# Patient Record
Sex: FEMALE | HISPANIC OR LATINO | Employment: UNEMPLOYED | URBAN - METROPOLITAN AREA
[De-identification: names, ages, dates, MRNs, and addresses within clinical notes are randomized per-mention and may not be internally consistent; named-entity substitution may affect disease eponyms.]

---
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
## 2023-01-05 ENCOUNTER — THERAPY VISIT (OUTPATIENT)
Dept: PHYSICAL THERAPY | Facility: CLINIC | Age: 18
End: 2023-01-05
Attending: ORTHOPAEDIC SURGERY
Payer: COMMERCIAL

## 2023-01-05 DIAGNOSIS — Z98.890 STATUS POST SURGERY: Primary | ICD-10-CM

## 2023-01-05 DIAGNOSIS — S82.891A ANKLE FRACTURE, RIGHT, CLOSED, INITIAL ENCOUNTER: ICD-10-CM

## 2023-01-05 PROCEDURE — 97110 THERAPEUTIC EXERCISES: CPT | Mod: 59

## 2023-01-05 PROCEDURE — 97530 THERAPEUTIC ACTIVITIES: CPT | Mod: GP

## 2023-01-05 NOTE — PROGRESS NOTES
Ankle/Foot Evaluation  ROM:    AROM:    Dorsiflexion:  Left:   6 knee straight  Right:   0  Plantarflexion:  Left:      Right:  64  Inversion:  Left:  40     Right:  40  Eversion:  30     Right:  10        PROM:    Dorsiflexion: Left:        Right:   4        Eversion: Left:      Right:  20    EDEMA: Edema ankle: incisions healing well.  Left ankle edema present at: 50  Right ankle edema present at:  52

## 2023-01-12 ENCOUNTER — THERAPY VISIT (OUTPATIENT)
Dept: PHYSICAL THERAPY | Facility: CLINIC | Age: 18
End: 2023-01-12
Attending: ORTHOPAEDIC SURGERY
Payer: COMMERCIAL

## 2023-01-12 DIAGNOSIS — S82.891A ANKLE FRACTURE, RIGHT, CLOSED, INITIAL ENCOUNTER: ICD-10-CM

## 2023-01-12 DIAGNOSIS — Z98.890 STATUS POST SURGERY: Primary | ICD-10-CM

## 2023-01-12 PROCEDURE — 97530 THERAPEUTIC ACTIVITIES: CPT | Mod: GP

## 2023-01-12 PROCEDURE — 97110 THERAPEUTIC EXERCISES: CPT | Mod: GP

## 2023-01-12 NOTE — PROGRESS NOTES
Ankle/Foot Evaluation  ROM:    AROM:    Dorsiflexion:  Left:   6 knee straight  Right:   4  Plantarflexion:  Left:      Right:  70  Inversion:  Left:  40     Right:  50  Eversion:  30     Right:  10    CKC DF Left: 3cm Right: 0 cm (3cm knee away from wall)      EDEMA: Edema ankle: incisions healing well.  Left ankle edema present at: 50  Right ankle edema present at:  52

## 2023-04-30 PROBLEM — S82.891A ANKLE FRACTURE, RIGHT, CLOSED, INITIAL ENCOUNTER: Status: RESOLVED | Noted: 2022-12-22 | Resolved: 2023-04-30

## 2023-04-30 PROBLEM — Z98.890 STATUS POST SURGERY: Status: RESOLVED | Noted: 2022-12-22 | Resolved: 2023-04-30

## 2023-06-02 ENCOUNTER — HEALTH MAINTENANCE LETTER (OUTPATIENT)
Age: 18
End: 2023-06-02

## 2023-06-20 ENCOUNTER — ANCILLARY PROCEDURE (OUTPATIENT)
Dept: GENERAL RADIOLOGY | Facility: CLINIC | Age: 18
End: 2023-06-20
Attending: ORTHOPAEDIC SURGERY
Payer: COMMERCIAL

## 2023-06-20 ENCOUNTER — OFFICE VISIT (OUTPATIENT)
Dept: ORTHOPEDICS | Facility: CLINIC | Age: 18
End: 2023-06-20
Payer: COMMERCIAL

## 2023-06-20 DIAGNOSIS — M25.571 PAIN IN JOINT, ANKLE AND FOOT, RIGHT: Primary | ICD-10-CM

## 2023-06-20 DIAGNOSIS — Z98.890 STATUS POST SURGERY: ICD-10-CM

## 2023-06-20 DIAGNOSIS — Z98.890 STATUS POST SURGERY: Primary | ICD-10-CM

## 2023-06-20 PROCEDURE — 99214 OFFICE O/P EST MOD 30 MIN: CPT | Performed by: ORTHOPAEDIC SURGERY

## 2023-06-20 PROCEDURE — 73610 X-RAY EXAM OF ANKLE: CPT | Mod: RT | Performed by: RADIOLOGY

## 2023-06-20 NOTE — LETTER
6/20/2023       RE: Barbie Bauer  [address]  [address]  [facility] [address]    Dear Colleague,    Thank you for referring your patient, Barbie Bauer, to the [facility] [place]. Please see a copy of my visit note below.    Chief complaint status post right ankle open reduction internal fixation performed on October 21, 2022    Barbie Bauer presents today for further evaluation unfortunately she did not have a 10-week follow-up given the fact that she went to [place] and now she is back.  Overall she reports to be doing okay.  Reports to have some achiness across the ankle joint.  The achiness is fairly diffuse as the as well as sporadic.    On today's exam she presented with full range of motion of the right ankle hindfoot and midfoot joint CMS intact skin is intact.  There is a Stone abrasion along the medial incision which I suspect it is from rubbing.    3 views of the ankle were reviewed today which are significant for showing complete consolidation of the fractures with hardware is intact and in place the syndesmosis congruent    Assessment status post right ankle proximal internal fixation    Plan discussed with the patient that at this point I do not have a clear understanding for the source of her discomfort.  I proposed her to proceed with hardware removal from the right ankle something that at this point she is not ready to pursue    All questions were answered.  Patient will proceed with activities as tolerated.  She has no restrictions.  Encouraged the patient to follow-up with us if she is inclined to proceed with hardware removal from the ankle    TT 20 minutes        Panchito Ryan MD

## 2023-06-20 NOTE — NURSING NOTE
Reason For Visit:   Chief Complaint   Patient presents with     RECHECK     Right ankle ORIF DOS 10/21/2022. Regular recheck. Was unable to recheck in earlier this year as she was in [place]. Doing well. XR today. Some intermittant swelling and a scab on incision       There were no vitals taken for this visit.         Tiara Dubon, ATC

## 2023-06-21 NOTE — PROGRESS NOTES
Chief complaint status post right ankle open reduction internal fixation performed on October 21, 2022    Barbie Bauer presents today for further evaluation unfortunately she did not have a 10-week follow-up given the fact that she went to [place] and now she is back.  Overall she reports to be doing okay.  Reports to have some achiness across the ankle joint.  The achiness is fairly diffuse as the as well as sporadic.    On today's exam she presented with full range of motion of the right ankle hindfoot and midfoot joint CMS intact skin is intact.  There is a [place] abrasion along the medial incision which I suspect it is from rubbing.    3 views of the ankle were reviewed today which are significant for showing complete consolidation of the fractures with hardware is intact and in place the syndesmosis congruent    Assessment status post right ankle proximal internal fixation    Plan discussed with the patient that at this point I do not have a clear understanding for the source of her discomfort.  I proposed her to proceed with hardware removal from the right ankle something that at this point she is not ready to pursue    All questions were answered.  Patient will proceed with activities as tolerated.  She has no restrictions.  Encouraged the patient to follow-up with us if she is inclined to proceed with hardware removal from the ankle    TT 20 minutes

## 2024-03-07 ENCOUNTER — OFFICE VISIT (OUTPATIENT)
Dept: FAMILY MEDICINE | Facility: CLINIC | Age: 19
End: 2024-03-07
Payer: COMMERCIAL

## 2024-03-07 VITALS
WEIGHT: 212.5 LBS | SYSTOLIC BLOOD PRESSURE: 133 MMHG | BODY MASS INDEX: 36.28 KG/M2 | TEMPERATURE: 99.5 F | RESPIRATION RATE: 17 BRPM | OXYGEN SATURATION: 98 % | HEART RATE: 112 BPM | HEIGHT: 64 IN | DIASTOLIC BLOOD PRESSURE: 83 MMHG

## 2024-03-07 DIAGNOSIS — Z00.00 ENCOUNTER FOR ANNUAL PHYSICAL EXAM: Primary | ICD-10-CM

## 2024-03-07 DIAGNOSIS — Z11.3 SCREEN FOR STD (SEXUALLY TRANSMITTED DISEASE): ICD-10-CM

## 2024-03-07 DIAGNOSIS — Z12.4 CERVICAL CANCER SCREENING: ICD-10-CM

## 2024-03-07 PROCEDURE — 87389 HIV-1 AG W/HIV-1&-2 AB AG IA: CPT

## 2024-03-07 PROCEDURE — 36415 COLL VENOUS BLD VENIPUNCTURE: CPT

## 2024-03-07 PROCEDURE — 99395 PREV VISIT EST AGE 18-39: CPT

## 2024-03-07 PROCEDURE — 87591 N.GONORRHOEAE DNA AMP PROB: CPT

## 2024-03-07 PROCEDURE — 86780 TREPONEMA PALLIDUM: CPT

## 2024-03-07 PROCEDURE — 87491 CHLMYD TRACH DNA AMP PROBE: CPT

## 2024-03-07 SDOH — HEALTH STABILITY: PHYSICAL HEALTH: ON AVERAGE, HOW MANY MINUTES DO YOU ENGAGE IN EXERCISE AT THIS LEVEL?: 60 MIN

## 2024-03-07 SDOH — HEALTH STABILITY: PHYSICAL HEALTH: ON AVERAGE, HOW MANY DAYS PER WEEK DO YOU ENGAGE IN MODERATE TO STRENUOUS EXERCISE (LIKE A BRISK WALK)?: 3 DAYS

## 2024-03-07 ASSESSMENT — PATIENT HEALTH QUESTIONNAIRE - PHQ9
SUM OF ALL RESPONSES TO PHQ QUESTIONS 1-9: 15
SUM OF ALL RESPONSES TO PHQ QUESTIONS 1-9: 15
10. IF YOU CHECKED OFF ANY PROBLEMS, HOW DIFFICULT HAVE THESE PROBLEMS MADE IT FOR YOU TO DO YOUR WORK, TAKE CARE OF THINGS AT HOME, OR GET ALONG WITH OTHER PEOPLE: SOMEWHAT DIFFICULT

## 2024-03-07 ASSESSMENT — SOCIAL DETERMINANTS OF HEALTH (SDOH): HOW OFTEN DO YOU GET TOGETHER WITH FRIENDS OR RELATIVES?: MORE THAN THREE TIMES A WEEK

## 2024-03-07 ASSESSMENT — PAIN SCALES - GENERAL: PAINLEVEL: MILD PAIN (2)

## 2024-03-07 NOTE — PROGRESS NOTES
Preventive Care Visit  [facility]GR[facility] PRIMARY [facility]  Alfredo Boone NP, Nurse Practitioner Primary Care  Mar 7, 2024    Assessment & Plan     Cervical cancer screening  - Ob/Gyn  Referral; Future    Screen for STD (sexually transmitted disease)  - NEISSERIA GONORRHOEA PCR; Future  - CHLAMYDIA TRACHOMATIS PCR; Future  - HIV Antigen Antibody Combo; Future  - Treponema Abs w Reflex to RPR and Titer; Future    Encounter for annual physical exam  Discussed priorities of getting into exercise routine from joining the gym.  Would also like to prioritize the patient's mental health and make sure that their mental health is in a good place.  We also discussed possibility of switching THC and cannabis vape to edibles and working on reducing to a point of not vaping every day.  Offered primary care services as a support for when they are not placed to cut down on THC.    Depression Screening Follow Up        3/7/2024    12:26 PM   PHQ   PHQ-9 Total Score 15   Q9: Thoughts of better off dead/self-harm past 2 weeks Not at all         Subjective   Karina is a 19 year old, presenting for the following:  Physical  Grew up in [place].    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  Just broke up yesterday. Still processing.  Stress concern: global stress. Able to function. Does community work.  Diet:  Eating a lot of smoothie bowls. Fruits and spinach. Pasta. Meats (1-2 dinners).  Exercise: started going to gym 2x/week. Goes to class with weight lifting focus.  Transportation difficulties- getting to the dentist.     MH:  Feels that depression. OCD-type symptoms. NO SI.      3/7/2024    12:40 PM   Additional Questions   Roomed by Marleny Rae        Health Care Directive  Patient does not have a Health Care Directive or Living Will: Not discussed at today's visit.    HPI        3/7/2024   General Health   How would you rate your overall physical health? Good    Feel stress (tense, anxious, or unable to sleep) Very much   (!) STRESS CONCERN      3/7/2024   Nutrition   Three or more servings of calcium each day? Yes   Diet: Regular (no restrictions)    Breakfast skipped   How many servings of fruit and vegetables per day? (!) 2-3   How many sweetened beverages each day? 0-1         3/7/2024   Exercise   Days per week of moderate/strenous exercise 3 days   Average minutes spent exercising at this level 60 min         3/7/2024   Social Factors   Frequency of gathering with friends or relatives More than three times a week   Worry food won't last until get money to buy more No   Food not last or not have enough money for food? No   Do you have housing?  Yes   Are you worried about losing your housing? No   Lack of transportation? Yes   Unable to get utilities (heat,electricity)? No    (!) TRANSPORTATION CONCERN PRESENT      3/7/2024   Dental   Dentist two times every year? (!) NO         3/7/2024   TB Screening   Were you born outside of [place]?  No       Today's PHQ-9 Score:       3/7/2024    12:26 PM   PHQ-9 SCORE   PHQ-9 Total Score MyChart 15 (Moderately severe depression)   PHQ-9 Total Score 15         3/7/2024   Substance Use   Alcohol more than 3/day or more than 7/wk No   Do you use any other substances recreationally? (!) CANNABIS PRODUCTS    (!) SYNTHETIC MARIJUANA     Social History     Tobacco Use    Smoking status: Never    Smokeless tobacco: Never    Tobacco comments:     vaping   Vaping Use    Vaping Use: Every day    Substances: THC   Substance Use Topics    Alcohol use: Yes     Comment: socially    Drug use: Yes     Types: Marijuana             3/7/2024   One time HIV Screening   Previous HIV test? No         3/7/2024   STI Screening   New sexual partner(s) since last STI/HIV test? (!) YES      History of abnormal Pap smear: NO - age 21-29 PAP every 3 years recommended             3/7/2024   Contraception/Family Planning   Questions about contraception or family  "planning No        Reviewed and updated as needed this visit by Provider                             Answers submitted by the patient for this visit:  Patient Health Questionnaire (Submitted on 3/7/2024)  If you checked off any problems, how difficult have these problems made it for you to do your work, take care of things at home, or get along with other people?: Somewhat difficult  PHQ9 TOTAL SCORE: 15      Objective    Exam  /83 (BP Location: Right arm, Patient Position: Sitting, Cuff Size: Adult Large)   Pulse 112   Temp 99.5  F (37.5  C) (Temporal)   Resp 17   Ht 1.616 m (5' 3.62\")   Wt 96.4 kg (212 lb 8 oz)   LMP  (LMP Unknown)   SpO2 98%   BMI 36.91 kg/m     Estimated body mass index is 36.91 kg/m  as calculated from the following:    Height as of this encounter: 1.616 m (5' 3.62\").    Weight as of this encounter: 96.4 kg (212 lb 8 oz).    Physical Exam  GENERAL: alert and no distress  EYES: Eyes grossly normal to inspection, PERRL and conjunctivae and sclerae normal  HENT: ear canals and TM's normal, nose and mouth without ulcers or lesions  NECK: no adenopathy, no asymmetry, masses, or scars  RESP: lungs clear to auscultation - no rales, rhonchi or wheezes  CV: regular rate and rhythm, normal S1 S2, no S3 or S4, no murmur, click or rub, no peripheral edema  ABDOMEN: soft, nontender, no hepatosplenomegaly, no masses and bowel sounds normal  MS: no gross musculoskeletal defects noted, no edema  SKIN: no suspicious lesions or rashes  NEURO: Normal strength and tone, mentation intact and speech normal  PSYCH: mentation appears normal, affect normal/bright      Signed Electronically by: Alfredo Boone NP    "

## 2024-03-07 NOTE — COMMUNITY RESOURCES LIST (ENGLISH)
03/07/2024   [facility]  N/A  For questions about this resource list or additional care needs, please contact your primary care clinic or care manager.  Phone: [phone]   Email: N/A   Address: [facility] [place] [address]   Hours: N/A        Transportation       Free or low-cost transportation  1  [facility] Distance: 1.57 miles      In-Person   [address] [place] [address]  Language: English  Hours: Mon - Fri 8:00 AM - 3:00 PM  Fees: Free   Phone: (565) 595-6728 Ext.14 Email: [email or web address] Website: [email or web address]     2  [facility] Distance: 1.63 miles      In-Person   [address] [place] [address]  Language: English  Hours: Mon - Wed 9:30 AM - 12:00 PM , Mon - Wed 1:00 PM - 2:00 PM Appt. Only  Fees: Free   Phone: (451) 691-3041 Email: info@[employer].org Website: http://www.[employer]/     Transportation to medical appointments  3  [facility] Medical Transportation - Non-Emergency Medical Transportation Distance: 6.69 miles      In-Person   [address] [place] [address]  Language: English  Hours: Mon - Fri 8:00 AM - 4:00 PM Appt. Only  Fees: Self Pay   Phone: (534) 867-1160 Website: http://[email or web address]/Medical-Services/Emergency-medical-services/Non-emergency-transportation/     4  [facility] Transportation Programs - Non-Emergency Medical Transportation Distance: 7.23 miles      In-Person   [address] [facility] [address] [place] [address]  Language: English, Jamaican, Namibian  Hours: Mon - Fri 7:00 AM - 6:00 PM  Fees: Insurance   Phone: (774) [phone] Ext.2232 Email: romel@[employer].net Website: http://[email or web address].net/[place]/          Important Numbers & Websites       Emergency Services   911  City Services   311  Poison Control   (398) 273-7502  Suicide Prevention Lifeline   (314) 680-9591 (TALK)  Child Abuse Hotline   (399) 524-5970 (4-A-Child)  Sexual  Assault Hotline   (529) 313-9609 (HOPE)  National Runaway Safeline   (907) 656-8983 (RUNAWAY)  All-Options Talkline   (237) 874-2979  Substance Abuse Referral   (780) 484-9881 (HELP)

## 2024-03-08 LAB
C TRACH DNA SPEC QL NAA+PROBE: NEGATIVE
HIV 1+2 AB+HIV1 P24 AG SERPL QL IA: NONREACTIVE
N GONORRHOEA DNA SPEC QL NAA+PROBE: NEGATIVE
T PALLIDUM AB SER QL: NONREACTIVE

## 2024-07-19 ENCOUNTER — MYC REFILL (OUTPATIENT)
Dept: FAMILY MEDICINE | Facility: CLINIC | Age: 19
End: 2024-07-19
Payer: COMMERCIAL

## 2024-07-22 DIAGNOSIS — F42.9 OBSESSIVE-COMPULSIVE DISORDER, UNSPECIFIED TYPE: Primary | ICD-10-CM

## 2024-07-22 RX ORDER — FLUOXETINE 40 MG/1
80 CAPSULE ORAL DAILY
OUTPATIENT
Start: 2024-07-22

## 2024-07-22 RX ORDER — FLUOXETINE 40 MG/1
CAPSULE ORAL
Qty: 42 CAPSULE | Refills: 1 | Status: SHIPPED | OUTPATIENT
Start: 2024-07-22 | End: 2024-09-16

## 2024-09-05 ENCOUNTER — TELEPHONE (OUTPATIENT)
Dept: FAMILY MEDICINE | Facility: CLINIC | Age: 19
End: 2024-09-05
Payer: COMMERCIAL

## 2024-09-05 NOTE — TELEPHONE ENCOUNTER
Patient is asking if they can get a mental health referral?    Thanks,     Constance Saul     [facility]  [phone] - Clinic Line  [phone] - Scheduling Line

## 2024-09-16 DIAGNOSIS — F42.9 OBSESSIVE-COMPULSIVE DISORDER, UNSPECIFIED TYPE: ICD-10-CM

## 2024-09-16 RX ORDER — FLUOXETINE 40 MG/1
80 CAPSULE ORAL DAILY
Qty: 60 CAPSULE | Refills: 3 | Status: SHIPPED | OUTPATIENT
Start: 2024-09-16

## 2024-09-21 ENCOUNTER — MYC MEDICAL ADVICE (OUTPATIENT)
Dept: FAMILY MEDICINE | Facility: CLINIC | Age: 19
End: 2024-09-21
Payer: COMMERCIAL

## 2024-09-21 ENCOUNTER — MYC REFILL (OUTPATIENT)
Dept: FAMILY MEDICINE | Facility: CLINIC | Age: 19
End: 2024-09-21
Payer: COMMERCIAL

## 2024-09-21 DIAGNOSIS — F42.9 OBSESSIVE-COMPULSIVE DISORDER, UNSPECIFIED TYPE: ICD-10-CM

## 2024-09-23 RX ORDER — FLUOXETINE 40 MG/1
80 CAPSULE ORAL DAILY
Qty: 60 CAPSULE | Refills: 3 | OUTPATIENT
Start: 2024-09-23

## 2024-09-23 NOTE — TELEPHONE ENCOUNTER
Refill sent the pharmacy 9/16/24    Rosemarie BHANDARI RN  [facility]

## 2024-12-17 DIAGNOSIS — F42.9 OBSESSIVE-COMPULSIVE DISORDER, UNSPECIFIED TYPE: ICD-10-CM

## 2024-12-17 RX ORDER — FLUOXETINE 40 MG/1
80 CAPSULE ORAL DAILY
Qty: 60 CAPSULE | Refills: 3 | Status: SHIPPED | OUTPATIENT
Start: 2024-12-17

## 2025-01-21 DIAGNOSIS — F42.9 OBSESSIVE-COMPULSIVE DISORDER, UNSPECIFIED TYPE: ICD-10-CM

## 2025-01-21 RX ORDER — FLUOXETINE 40 MG/1
80 CAPSULE ORAL DAILY
Qty: 60 CAPSULE | Refills: 3 | OUTPATIENT
Start: 2025-01-21

## 2025-01-29 NOTE — PROGRESS NOTES
SUBJECTIVE:   Karina is a 20-year-old complaining of increased amount of white vaginal discharge for 2 weeks. Other symptoms include abnormal odor and itching. Denies urinary symptoms. Not concerned about STD exposure but open to testing today.     Typically has irregular menstrual cyles - 2-3 cycles yearly. They are sexually active with partner who can get them pregnant. Partner is taking hormones to transition to female and was under the impression that she could not get someone pregnant at this point.     Works as debate team  with young teens.     Patient's last menstrual period was 12/18/2024.    OBJECTIVE:   /80   Pulse 98   Temp 97.7  F (36.5  C) (Oral)   Wt 95.2 kg (209 lb 14.4 oz)   LMP 12/18/2024   SpO2 97%   Breastfeeding No   BMI 36.46 kg/m      They appear well, afebrile.  Abdomen: benign, soft, nontender, no masses.  Pelvic Exam: normal vagina and vulva, normal cervix without lesions or tenderness, pap smear done.  Urine dipstick: small blood  Urine micro: few bacteria, WBC 5-10, few squamous epis  Wet prep: +clue cells, -yeast, -trich, 2+ WBCs    ASSESSMENT/PLAN:   (Z12.4) Screening for cervical cancer  (primary encounter diagnosis)  Comment:   Plan: Pap imaged thin layer screen only - recommended        age 21 - 24 years            (Z11.3) Screen for STD (sexually transmitted disease)  Comment:   Plan: NEISSERIA GONORRHOEA PCR, CHLAMYDIA TRACHOMATIS        PCR            (N89.8) Vaginal discharge  Comment:   Plan: NEISSERIA GONORRHOEA PCR, CHLAMYDIA TRACHOMATIS        PCR, Wet prep - Clinic Collect, UA reflex to         Microscopic - lab collect, Urine Culture         Aerobic Bacterial - lab collect, Urine         Microscopic Exam            (N76.0,  B96.89) Bacterial vaginosis  Comment:   Plan: metroNIDAZOLE (FLAGYL) 500 MG tablet          Will notify patient of lab results and any treatment via MyChart  ROV prn if symptoms persist or worsen.   RTC in 1 year for annual exam, sooner  thea Loya CNM    Answers submitted by the patient for this visit:  Patient Health Questionnaire (Submitted on 1/30/2025)  If you checked off any problems, how difficult have these problems made it for you to do your work, take care of things at home, or get along with other people?: Somewhat difficult  PHQ9 TOTAL SCORE: 17

## 2025-01-30 ENCOUNTER — OFFICE VISIT (OUTPATIENT)
Dept: MIDWIFE SERVICES | Facility: CLINIC | Age: 20
End: 2025-01-30
Payer: COMMERCIAL

## 2025-01-30 VITALS
SYSTOLIC BLOOD PRESSURE: 118 MMHG | TEMPERATURE: 97.7 F | BODY MASS INDEX: 36.46 KG/M2 | OXYGEN SATURATION: 97 % | HEART RATE: 98 BPM | DIASTOLIC BLOOD PRESSURE: 80 MMHG | WEIGHT: 209.9 LBS

## 2025-01-30 DIAGNOSIS — N89.8 VAGINAL DISCHARGE: ICD-10-CM

## 2025-01-30 DIAGNOSIS — Z11.3 SCREEN FOR STD (SEXUALLY TRANSMITTED DISEASE): ICD-10-CM

## 2025-01-30 DIAGNOSIS — B96.89 BACTERIAL VAGINOSIS: ICD-10-CM

## 2025-01-30 DIAGNOSIS — Z12.4 SCREENING FOR CERVICAL CANCER: Primary | ICD-10-CM

## 2025-01-30 DIAGNOSIS — N76.0 BACTERIAL VAGINOSIS: ICD-10-CM

## 2025-01-30 PROBLEM — F33.1 MODERATE RECURRENT MAJOR DEPRESSION (H): Status: ACTIVE | Noted: 2025-01-30

## 2025-01-30 LAB
ALBUMIN UR-MCNC: NEGATIVE MG/DL
APPEARANCE UR: CLEAR
BACTERIA #/AREA URNS HPF: ABNORMAL /HPF
BILIRUB UR QL STRIP: NEGATIVE
CLUE CELLS: PRESENT
COLOR UR AUTO: YELLOW
GLUCOSE UR STRIP-MCNC: NEGATIVE MG/DL
HGB UR QL STRIP: ABNORMAL
KETONES UR STRIP-MCNC: NEGATIVE MG/DL
LEUKOCYTE ESTERASE UR QL STRIP: NEGATIVE
NITRATE UR QL: NEGATIVE
PH UR STRIP: 6 [PH] (ref 5–7)
RBC #/AREA URNS AUTO: ABNORMAL /HPF
SP GR UR STRIP: 1.01 (ref 1–1.03)
SQUAMOUS #/AREA URNS AUTO: ABNORMAL /LPF
TRICHOMONAS, WET PREP: ABNORMAL
UROBILINOGEN UR STRIP-ACNC: 0.2 E.U./DL
WBC #/AREA URNS AUTO: ABNORMAL /HPF
WBC'S/HIGH POWER FIELD, WET PREP: ABNORMAL
YEAST, WET PREP: ABNORMAL

## 2025-01-30 RX ORDER — METRONIDAZOLE 500 MG/1
500 TABLET ORAL 2 TIMES DAILY
Qty: 14 TABLET | Refills: 0 | Status: SHIPPED | OUTPATIENT
Start: 2025-01-30 | End: 2025-02-06

## 2025-01-30 ASSESSMENT — ANXIETY QUESTIONNAIRES
IF YOU CHECKED OFF ANY PROBLEMS ON THIS QUESTIONNAIRE, HOW DIFFICULT HAVE THESE PROBLEMS MADE IT FOR YOU TO DO YOUR WORK, TAKE CARE OF THINGS AT HOME, OR GET ALONG WITH OTHER PEOPLE: SOMEWHAT DIFFICULT
GAD7 TOTAL SCORE: 18
1. FEELING NERVOUS, ANXIOUS, OR ON EDGE: NEARLY EVERY DAY
2. NOT BEING ABLE TO STOP OR CONTROL WORRYING: NEARLY EVERY DAY
5. BEING SO RESTLESS THAT IT IS HARD TO SIT STILL: MORE THAN HALF THE DAYS
GAD7 TOTAL SCORE: 18
6. BECOMING EASILY ANNOYED OR IRRITABLE: MORE THAN HALF THE DAYS
7. FEELING AFRAID AS IF SOMETHING AWFUL MIGHT HAPPEN: NEARLY EVERY DAY
3. WORRYING TOO MUCH ABOUT DIFFERENT THINGS: NEARLY EVERY DAY

## 2025-01-30 ASSESSMENT — PATIENT HEALTH QUESTIONNAIRE - PHQ9
SUM OF ALL RESPONSES TO PHQ QUESTIONS 1-9: 17
5. POOR APPETITE OR OVEREATING: MORE THAN HALF THE DAYS
10. IF YOU CHECKED OFF ANY PROBLEMS, HOW DIFFICULT HAVE THESE PROBLEMS MADE IT FOR YOU TO DO YOUR WORK, TAKE CARE OF THINGS AT HOME, OR GET ALONG WITH OTHER PEOPLE: SOMEWHAT DIFFICULT
SUM OF ALL RESPONSES TO PHQ QUESTIONS 1-9: 17

## 2025-02-04 LAB
BKR LAB AP GYN ADEQUACY: NORMAL
BKR LAB AP GYN INTERPRETATION: NORMAL
BKR LAB AP HPV REFLEX: NO
BKR LAB AP LMP: NORMAL
BKR LAB AP PREVIOUS ABNORMAL: NORMAL
PATH REPORT.COMMENTS IMP SPEC: NORMAL
PATH REPORT.COMMENTS IMP SPEC: NORMAL
PATH REPORT.RELEVANT HX SPEC: NORMAL

## 2025-02-05 ENCOUNTER — PATIENT OUTREACH (OUTPATIENT)
Dept: CARE COORDINATION | Facility: CLINIC | Age: 20
End: 2025-02-05
Payer: COMMERCIAL

## 2025-02-19 ENCOUNTER — PATIENT OUTREACH (OUTPATIENT)
Dept: CARE COORDINATION | Facility: CLINIC | Age: 20
End: 2025-02-19
Payer: COMMERCIAL

## 2025-04-19 ENCOUNTER — HEALTH MAINTENANCE LETTER (OUTPATIENT)
Age: 20
End: 2025-04-19

## 2025-04-29 DIAGNOSIS — F42.9 OBSESSIVE-COMPULSIVE DISORDER, UNSPECIFIED TYPE: ICD-10-CM

## 2025-04-29 RX ORDER — FLUOXETINE HYDROCHLORIDE 40 MG/1
80 CAPSULE ORAL DAILY
Qty: 180 CAPSULE | Refills: 0 | Status: SHIPPED | OUTPATIENT
Start: 2025-04-29

## 2025-06-09 ENCOUNTER — MYC REFILL (OUTPATIENT)
Dept: FAMILY MEDICINE | Facility: CLINIC | Age: 20
End: 2025-06-09
Payer: COMMERCIAL

## 2025-06-09 DIAGNOSIS — F42.9 OBSESSIVE-COMPULSIVE DISORDER, UNSPECIFIED TYPE: ICD-10-CM

## 2025-06-09 RX ORDER — FLUOXETINE HYDROCHLORIDE 40 MG/1
80 CAPSULE ORAL DAILY
Qty: 90 CAPSULE | Refills: 0 | Status: SHIPPED | OUTPATIENT
Start: 2025-06-09

## 2025-08-30 ENCOUNTER — MYC REFILL (OUTPATIENT)
Dept: FAMILY MEDICINE | Facility: CLINIC | Age: 20
End: 2025-08-30
Payer: COMMERCIAL

## 2025-08-30 DIAGNOSIS — F42.9 OBSESSIVE-COMPULSIVE DISORDER, UNSPECIFIED TYPE: ICD-10-CM

## 2025-09-02 RX ORDER — FLUOXETINE HYDROCHLORIDE 40 MG/1
80 CAPSULE ORAL DAILY
Qty: 60 CAPSULE | Refills: 0 | Status: SHIPPED | OUTPATIENT
Start: 2025-09-02

## 2025-09-03 ENCOUNTER — PATIENT OUTREACH (OUTPATIENT)
Dept: CARE COORDINATION | Facility: CLINIC | Age: 20
End: 2025-09-03
Payer: COMMERCIAL